# Patient Record
Sex: MALE | Race: WHITE | NOT HISPANIC OR LATINO
[De-identification: names, ages, dates, MRNs, and addresses within clinical notes are randomized per-mention and may not be internally consistent; named-entity substitution may affect disease eponyms.]

---

## 2022-11-16 ENCOUNTER — APPOINTMENT (OUTPATIENT)
Dept: OPHTHALMOLOGY | Facility: CLINIC | Age: 67
End: 2022-11-16

## 2023-11-17 ENCOUNTER — APPOINTMENT (OUTPATIENT)
Dept: DERMATOLOGY | Facility: CLINIC | Age: 68
End: 2023-11-17

## 2024-09-05 ENCOUNTER — OUTPATIENT (OUTPATIENT)
Dept: OUTPATIENT SERVICES | Facility: HOSPITAL | Age: 69
LOS: 1 days | End: 2024-09-05
Payer: COMMERCIAL

## 2024-09-05 VITALS
TEMPERATURE: 97 F | RESPIRATION RATE: 20 BRPM | HEIGHT: 69 IN | WEIGHT: 220.46 LBS | DIASTOLIC BLOOD PRESSURE: 80 MMHG | SYSTOLIC BLOOD PRESSURE: 120 MMHG | HEART RATE: 72 BPM | OXYGEN SATURATION: 99 %

## 2024-09-05 DIAGNOSIS — Z01.818 ENCOUNTER FOR OTHER PREPROCEDURAL EXAMINATION: ICD-10-CM

## 2024-09-05 DIAGNOSIS — Z98.89 OTHER SPECIFIED POSTPROCEDURAL STATES: Chronic | ICD-10-CM

## 2024-09-05 LAB
ALBUMIN SERPL ELPH-MCNC: 4 G/DL — SIGNIFICANT CHANGE UP (ref 3.3–5.2)
ALP SERPL-CCNC: 41 U/L — SIGNIFICANT CHANGE UP (ref 40–120)
ALT FLD-CCNC: 17 U/L — SIGNIFICANT CHANGE UP
ANION GAP SERPL CALC-SCNC: 13 MMOL/L — SIGNIFICANT CHANGE UP (ref 5–17)
APTT BLD: 37.1 SEC — HIGH (ref 24.5–35.6)
AST SERPL-CCNC: 22 U/L — SIGNIFICANT CHANGE UP
BASOPHILS # BLD AUTO: 0.07 K/UL — SIGNIFICANT CHANGE UP (ref 0–0.2)
BASOPHILS NFR BLD AUTO: 0.9 % — SIGNIFICANT CHANGE UP (ref 0–2)
BILIRUB SERPL-MCNC: 0.7 MG/DL — SIGNIFICANT CHANGE UP (ref 0.4–2)
BLD GP AB SCN SERPL QL: SIGNIFICANT CHANGE UP
BUN SERPL-MCNC: 27.9 MG/DL — HIGH (ref 8–20)
CALCIUM SERPL-MCNC: 10.6 MG/DL — HIGH (ref 8.4–10.5)
CHLORIDE SERPL-SCNC: 101 MMOL/L — SIGNIFICANT CHANGE UP (ref 96–108)
CO2 SERPL-SCNC: 24 MMOL/L — SIGNIFICANT CHANGE UP (ref 22–29)
CREAT SERPL-MCNC: 1.58 MG/DL — HIGH (ref 0.5–1.3)
EGFR: 47 ML/MIN/1.73M2 — LOW
EOSINOPHIL # BLD AUTO: 0.41 K/UL — SIGNIFICANT CHANGE UP (ref 0–0.5)
EOSINOPHIL NFR BLD AUTO: 5.2 % — SIGNIFICANT CHANGE UP (ref 0–6)
GLUCOSE SERPL-MCNC: 110 MG/DL — HIGH (ref 70–99)
HCT VFR BLD CALC: 45.5 % — SIGNIFICANT CHANGE UP (ref 39–50)
HGB BLD-MCNC: 15.7 G/DL — SIGNIFICANT CHANGE UP (ref 13–17)
IMM GRANULOCYTES NFR BLD AUTO: 0.9 % — SIGNIFICANT CHANGE UP (ref 0–0.9)
INR BLD: 1.01 RATIO — SIGNIFICANT CHANGE UP (ref 0.85–1.18)
LYMPHOCYTES # BLD AUTO: 1.46 K/UL — SIGNIFICANT CHANGE UP (ref 1–3.3)
LYMPHOCYTES # BLD AUTO: 18.7 % — SIGNIFICANT CHANGE UP (ref 13–44)
MAGNESIUM SERPL-MCNC: 1.8 MG/DL — SIGNIFICANT CHANGE UP (ref 1.8–2.6)
MCHC RBC-ENTMCNC: 30.5 PG — SIGNIFICANT CHANGE UP (ref 27–34)
MCHC RBC-ENTMCNC: 34.5 GM/DL — SIGNIFICANT CHANGE UP (ref 32–36)
MCV RBC AUTO: 88.5 FL — SIGNIFICANT CHANGE UP (ref 80–100)
MONOCYTES # BLD AUTO: 0.65 K/UL — SIGNIFICANT CHANGE UP (ref 0–0.9)
MONOCYTES NFR BLD AUTO: 8.3 % — SIGNIFICANT CHANGE UP (ref 2–14)
NEUTROPHILS # BLD AUTO: 5.16 K/UL — SIGNIFICANT CHANGE UP (ref 1.8–7.4)
NEUTROPHILS NFR BLD AUTO: 66 % — SIGNIFICANT CHANGE UP (ref 43–77)
PLATELET # BLD AUTO: 166 K/UL — SIGNIFICANT CHANGE UP (ref 150–400)
POTASSIUM SERPL-MCNC: 5.1 MMOL/L — SIGNIFICANT CHANGE UP (ref 3.5–5.3)
POTASSIUM SERPL-SCNC: 5.1 MMOL/L — SIGNIFICANT CHANGE UP (ref 3.5–5.3)
PROT SERPL-MCNC: 6.9 G/DL — SIGNIFICANT CHANGE UP (ref 6.6–8.7)
PROTHROM AB SERPL-ACNC: 11.2 SEC — SIGNIFICANT CHANGE UP (ref 9.5–13)
RBC # BLD: 5.14 M/UL — SIGNIFICANT CHANGE UP (ref 4.2–5.8)
RBC # FLD: 13.6 % — SIGNIFICANT CHANGE UP (ref 10.3–14.5)
SODIUM SERPL-SCNC: 138 MMOL/L — SIGNIFICANT CHANGE UP (ref 135–145)
WBC # BLD: 7.82 K/UL — SIGNIFICANT CHANGE UP (ref 3.8–10.5)
WBC # FLD AUTO: 7.82 K/UL — SIGNIFICANT CHANGE UP (ref 3.8–10.5)

## 2024-09-05 PROCEDURE — 86900 BLOOD TYPING SEROLOGIC ABO: CPT

## 2024-09-05 PROCEDURE — 85025 COMPLETE CBC W/AUTO DIFF WBC: CPT

## 2024-09-05 PROCEDURE — 80053 COMPREHEN METABOLIC PANEL: CPT

## 2024-09-05 PROCEDURE — 85610 PROTHROMBIN TIME: CPT

## 2024-09-05 PROCEDURE — 93005 ELECTROCARDIOGRAM TRACING: CPT

## 2024-09-05 PROCEDURE — 86850 RBC ANTIBODY SCREEN: CPT

## 2024-09-05 PROCEDURE — G0463: CPT

## 2024-09-05 PROCEDURE — 93010 ELECTROCARDIOGRAM REPORT: CPT

## 2024-09-05 PROCEDURE — 83735 ASSAY OF MAGNESIUM: CPT

## 2024-09-05 PROCEDURE — 86901 BLOOD TYPING SEROLOGIC RH(D): CPT

## 2024-09-05 PROCEDURE — 36415 COLL VENOUS BLD VENIPUNCTURE: CPT

## 2024-09-05 PROCEDURE — 85730 THROMBOPLASTIN TIME PARTIAL: CPT

## 2024-09-05 RX ORDER — PREDNISONE 10 MG
1 TABLET, DOSE PACK ORAL
Qty: 3 | Refills: 0
Start: 2024-09-05

## 2024-09-05 NOTE — H&P PST ADULT - NSICDXPASTSURGICALHX_GEN_ALL_CORE_FT
PAST SURGICAL HISTORY:  History of hernia surgery umbilical and abdominal hernia repair    S/P cardiac catheterization jan 2015    S/P debridement s/p boil resection on left groin with infection and debridement 2014

## 2024-09-05 NOTE — H&P PST ADULT - ASSESSMENT
Risk Assessments:  ASA:  Mallampati:  Creat:   GFR:   BRA:  Pt assessed, appropriate for sedation and educated regarding plan for Versed/Fentanyl as needed    Indication:     Coronary Anatomy:     Plan:    -plan for *** via ***  -preferred access:   -confirmed appropriate NPO duration  -ECG and Labs reviewed  -Aspirin 81mg po pre-cath  -Normal Saline 0.9%  250ml/hr IV: pre procedure ANA ppx   -procedure discussed with patient; risks and benefits explained, questions answered  -consent obtained by attending      Risk Assessments:  ASA: 3  Mallampati: 3  Creat:   GFR:   BRA:  Pt assessed, appropriate for sedation and educated regarding plan for Versed/Fentanyl as needed    Indication: reduced EF on stress, known CAD s/p  CABG x 3v and PCI       Plan:    -plan for [facility]   -preferred access: RFA vs LRA   -confirmed appropriate NPO duration  -ECG and Labs reviewed  -Aspirin 81mg po pre-cath  -hold xarelto x 3 days, hold metformin evening prior, hold farxiga morning of procedure and take 1/2 dose of Tresiba(20 units) evening prior   -Normal Saline 0.9%  250ml/hr IV: pre procedure ANA ppx   -procedure discussed with patient; risks and benefits explained, questions answered  -consent obtained by attending

## 2024-09-05 NOTE — H&P PST ADULT - NSICDXPASTMEDICALHX_GEN_ALL_CORE_FT
PAST MEDICAL HISTORY:  Aortic aneurysm aortic root dilitation ~ 3.9 cm as seen on echo done 12/2014    Bell's palsy 2009 mild with resolution    Carotid artery disease carotid doppler done 11/26/2014mild heterogenous plauque bilateral with no significant stenosis (0-39%)    Coronary artery disease 3vCAD    Deep vein thrombosis (DVT) 8/2009 chronic left LLE DVT pt reports last LLE doppler done 2014 (on lifetime xarelto) f/u with Dr. 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

## 2024-09-05 NOTE — H&P PST ADULT - HISTORY OF PRESENT ILLNESS
HPI:     Symptoms:        Angina (Class):        Ischemic Symptoms:     Heart Failure:        Systolic/Diastolic/Combined:        NYHA Class (within 2 weeks):     Assessment of LVEF (Must be within 6 months):       EF:        Assessed by:        Date:     Echo (Date, Findings):     Prior Cardiac Interventions:       PCI's (Date, Stents, Vessels):        CABG (Date, Grafts):     Noninvasive Testing:   Stress Test: Date:        Protocol:        Duration of Exercise:        Symptoms:        EKG Changes:        DTS:        Myocardial Imaging:        Risk Assessment (Low, Medium, High):       Antianginal Therapies:        Beta Blockers:         Calcium Channel Blockers:        Long Acting Nitrates:        Ranexa:       Associated Risk Factors:        Fraility Assessment: N/A (mild, moderate, severe)       Cerebrovascular Disease: N/A       Chronic Lung Disease: N/A       Peripheral Arterial Disease: N/A       Chronic Kidney Disease (if yes, what is GFR): N/A       Uncontrolled Diabetes (if yes, what is HgbA1C or FBS): N/A       Poorly Controlled Hypertension (if yes, what is SBP): N/A       Morbid Obesity (if yes, what is BMI): N/A       History of Recent Ventricular Arrhythmia: N/A       Inability to Ambulate Safely: N/A       Need for Therapeutic Anticoagulation: N/A       Antiplatelet or Contrast Allergy: N/A    Social History:        Marital:         Tobacco:        ETOH:        Caffeine:     ROS:  CONSTITUTIONAL: No weakness, fevers or chills  EYES/ENT: No visual changes;  No vertigo or throat pain   NECK: No pain or stiffness  RESPIRATORY: No cough, wheezing, hemoptysis; No shortness of breath  CARDIOVASCULAR: No chest pain or palpitations  GASTROINTESTINAL: No abdominal or epigastric pain. No nausea, vomiting, or hematemesis; No diarrhea or constipation. No melena or hematochezia.  GENITOURINARY: No dysuria, frequency or hematuria  NEUROLOGICAL: No numbness or weakness  SKIN: No itching, burning, rashes, or lesions   All other review of systems is negative unless indicated above    PHYSICAL EXAM:    Vital Signs Last 24 Hrs  T(C): --  T(F): --  HR: --  BP: --  BP(mean): --  RR: --  SpO2: --        GENERAL: Pt lying comfortably, NAD.  ENMT: PERRL, +EOMI.  NECK: soft, Supple, No JVD,   CHEST/LUNG: Clear to auscultatation bilaterally; No wheezing.  HEART: S1S2+, Regular rate and rhythm; No murmurs.  ABDOMEN: Soft, Nontender, Nondistended; Bowel sounds present.  MUSCULOSKELETAL: Normal range of motion.  SKIN: No rashes or lesions.  NEURO: AAOX3, no focal deficits, no motor r sensory loss.  PSYCH: normal mood.  VASCULAR:   Radial +2 R/+2 L  Femoral +2 R/+2 L  PT +2 R/+2 L  DP +2 R/+2 L    EKG:        HPI: This is a 69 year old male with PMH of DVT, Factor V Leiden, Htn, Gout, HLD, and CAD s/p CABG x 3  vessel 2/2015, subsequent PCI 8/2015 and 9/2015 see details below. He was seen for routine follow up at cardiologist office. Although TTE showed EF of 52% stress test had a normal perfusion scan, the EF was reduced on Stress calculation of 37%. It has been 9 years since last [facility], decision for angiogram to rule out CAD progression. Now presents to PST for upcoming procedure with Dr. Hinson on 9/10/2024.Pt denies chest pain, palps, dizziness, orthopnea, or PND. Has occ VELASCO. Of note patient had CKD and doesnt know his baseline creat, labs from 4/17/2024 in PST packet reveal creat of 1.83, GFR 40. Will repeat labs and discuss findings with Dr. Hinson. (needs pre-    Symptoms:        Angina (Class): II        Ischemic Symptoms: VELASCO     Heart Failure: ?        Systolic/Diastolic/Combined: EF on TTE 52% vs 37% on MPS        NYHA Class (within 2 weeks):       Echo (Date, Findings): EF low normal 52%, mild VHD and no WMA     Prior Cardiac Interventions:       PCI's (Date, Stents, Vessels): 8/2015 PCI distal to anastomosis of RPDA, 9/2015 3 BRENT to prox, mid, and distal CIRC       CABG (Date, Grafts): CABG x 3 vessel 2/2015 LIMA to LAD, SVG to OM and SVG to RPDA   < from: Cardiac Cath Lab - Adult (08.26.15 @ 08:31) >  --  Intervention on right PDA: drug-eluting stent.    < end of copied text >  < from: Cardiac Cath Lab - Adult (08.26.15 @ 08:31) >  MEDICATIONS GIVEN: Midazolam, 1 mg, IV. Fentanyl, 50 mcg, IV.  Nitroglycerin, 200 mcg, intracoronary. Nitroglycerin, 200 mcg,  intracoronary. Bivalirudin (Angiomax), 18 ml, IV. Bivalirudin (Angiomax),  infusion rate of 42, IV. Clopidogrel (Plavix), 600 mg,PO.  VENTRICLES: There were no left ventricular global or regional wall motion  abnormalities. Global left ventricular function was normal. EF estimated  was 50 %.  VALVES: MITRAL VALVE: The mitral valve exhibited trivial regurgitation  (less than 1+).  CORONARY VESSELS: The coronary circulation is right dominant.  LM:   --  LM: Normal.  LAD:   --  Mid LAD: There was a diffuse 90 % stenosis.  CX:   --  Proximal circumflex: There was a diffuse 85 % stenosis.  --  Mid circumflex: There was a diffuse 80 % stenosis.  --  OM1: There was a diffuse 70 % stenosis in the proximal third of the  vessel segment.  RCA:   --  Mid RCA: There was a 100 % stenosis.  --  RPDA: There was a diffuse 90 % stenosis distal to the graft  anastomosis. The lesion was hazy. This was treated with BRENT   GRAFTS:   --  Graft to the mid LAD: The graft was a LIMA. It was normal.  --  Graft to the 1st obtuse marginal: The graft was a saphenous vein graft  from the aorta. There was a 100 % stenosis at the proximal anastomosis.  --  Graft to the distal RCA: The graft was a saphenous vein graft from the  aorta. Graft angiography showed mild atherosclerosis.  COMPLICATIONS: No complications occurred during the cath lab visit. No  complications occurred during the cath lab visit.    < from: Cardiac Cath Lab - Adult (09.24.15 @ 17:16) >  LM:   --  LM: Normal.  LAD:   --  LAD: This vessel was not injected.  CX:   --  Proximal circumflex: There was a diffuse 90 % stenosis.  --  Mid circumflex: There was a tubular 80 % stenosis.  --  Distal circumflex: There was a diffuse 80 % stenosis.  RCA:   --  RPDA: There was a tubular 0 % stenosis distal to the graft  anastomosis.  COMPLICATIONS: No complications occurred during the cath lab visit.  SUMMARY:  CORONARY VESSELS: RPDA: There was a tubular 0 % stenosis distal to the  graft anastomosis.  1ST LESION INTERVENTIONS: A successful drug-eluting stent was performed on  the 80 % lesion in the distal circumflex. Following intervention there was  an excellent angiographic appearance with a 0 % residual stenosis.  2ND LESION INTERVENTIONS: A successful drug-eluting stent was performed on  the 90 % lesion in the proximal circumflex. Following intervention there  was an excellent angiographic appearance with a0 % residual stenosis.  3RD LESION INTERVENTIONS: A successful drug-eluting stent was performed on  the 80 % lesion in the mid circumflex. Following intervention there was an  excellent angiographic appearance with a 0 % residual stenosis.  DIAGNOSTIC RECOMMENDATIONS: Can restart Xarelto tomorrow  Can discontinue Aspirin in one month.      Noninvasive Testing:   Stress Test: Date:   7/1/2024 sestamibi  Negative for stress induced ischemia, newly reduced EF 37%      Antianginal Therapies:        Beta Blockers:         Calcium Channel Blockers:        Long Acting Nitrates:        Ranexa:       Associated Risk Factors:        Fraility Assessment: N/A (mild, moderate, severe)       Cerebrovascular Disease: N/A       Chronic Lung Disease: N/A       Peripheral Arterial Disease: N/A       Chronic Kidney Disease (if yes, what is GFR): N/A       Uncontrolled Diabetes (if yes, what is HgbA1C or FBS): N/A       Poorly Controlled Hypertension (if yes, what is SBP): N/A       Morbid Obesity (if yes, what is BMI): N/A       History of Recent Ventricular Arrhythmia: N/A       Inability to Ambulate Safely: N/A       Need for Therapeutic Anticoagulation: N/A       Antiplatelet or Contrast Allergy: N/A    Social History:        Marital:   retired from [employer], still works part time as [employer], denies tobacco or drug use occasional alcoholic beverage     ROS:  CONSTITUTIONAL: No weakness, fevers or chills  EYES/ENT: No visual changes;  No vertigo or throat pain   NECK: No pain or stiffness  RESPIRATORY: No cough, wheezing, hemoptysis; No shortness of breath  CARDIOVASCULAR: No chest pain or palpitations  GASTROINTESTINAL: No abdominal or epigastric pain. No nausea, vomiting, or hematemesis; No diarrhea or constipation. No melena or hematochezia.  GENITOURINARY: No dysuria, frequency or hematuria  NEUROLOGICAL: No numbness or weakness  SKIN: No itching, burning, rashes, or lesions   All other review of systems is negative unless indicated above    PHYSICAL EXAM:      GENERAL: Pt lying comfortably, NAD.  ENMT: PERRL, +EOMI.  NECK: soft, Supple, No JVD,   CHEST/LUNG: Clear to auscultatation bilaterally; No wheezing.  HEART: S1S2+, Regular rate and rhythm; No murmurs.  ABDOMEN: Soft, Nontender, Nondistended; Bowel sounds present.  MUSCULOSKELETAL: Normal range of motion.  SKIN: No rashes or lesions.  NEURO: AAOX3, no focal deficits, no motor r sensory loss.  PSYCH: normal mood.  VASCULAR:   Radial +2 R/+2 L  Femoral +2 R/+2 L  PT +2 R/+2 L  DP +2 R/+2 L    EKG: NSR 71 bpm        HPI: This is a 69 year old male with PMH of DVT, Factor V Leiden, Htn, Gout, HLD, and CAD s/p CABG x 3  vessel 2/2015, subsequent PCI 8/2015 and 9/2015 see details below. He was seen for routine follow up at cardiologist office. Although TTE showed EF of 52% stress test had a normal perfusion scan, the EF was reduced on Stress calculation of 37%. It has been 9 years since last [facility], decision for angiogram to rule out CAD progression. Now presents to PST for upcoming procedure with Dr. Hinson on 9/10/2024.Pt denies chest pain, palps, dizziness, orthopnea, or PND. Has occ VELASCO. Of note patient had CKD and doesnt know his baseline creat, labs from 4/17/2024 in PST packet reveal creat of 1.83, GFR 40. Will repeat labs and discuss findings with Dr. Hinson. (pre med prednisone sent to pharm for IVC allergy)    Symptoms:        Angina (Class): II        Ischemic Symptoms: VELASCO     Heart Failure: ?        Systolic/Diastolic/Combined: EF on TTE 52% vs 37% on MPS        NYHA Class (within 2 weeks):       Echo (Date, Findings): EF low normal 52%, mild VHD and no WMA     Prior Cardiac Interventions:       PCI's (Date, Stents, Vessels): 8/2015 PCI distal to anastomosis of RPDA, 9/2015 3 BRENT to prox, mid, and distal CIRC       CABG (Date, Grafts): CABG x 3 vessel 2/2015 LIMA to LAD, SVG to OM and SVG to RPDA   < from: Cardiac Cath Lab - Adult (08.26.15 @ 08:31) >  --  Intervention on right PDA: drug-eluting stent.    < end of copied text >  < from: Cardiac Cath Lab - Adult (08.26.15 @ 08:31) >  MEDICATIONS GIVEN: Midazolam, 1 mg, IV. Fentanyl, 50 mcg, IV.  Nitroglycerin, 200 mcg, intracoronary. Nitroglycerin, 200 mcg,  intracoronary. Bivalirudin (Angiomax), 18 ml, IV. Bivalirudin (Angiomax),  infusion rate of 42, IV. Clopidogrel (Plavix), 600 mg,PO.  VENTRICLES: There were no left ventricular global or regional wall motion  abnormalities. Global left ventricular function was normal. EF estimated  was 50 %.  VALVES: MITRAL VALVE: The mitral valve exhibited trivial regurgitation  (less than 1+).  CORONARY VESSELS: The coronary circulation is right dominant.  LM:   --  LM: Normal.  LAD:   --  Mid LAD: There was a diffuse 90 % stenosis.  CX:   --  Proximal circumflex: There was a diffuse 85 % stenosis.  --  Mid circumflex: There was a diffuse 80 % stenosis.  --  OM1: There was a diffuse 70 % stenosis in the proximal third of the  vessel segment.  RCA:   --  Mid RCA: There was a 100 % stenosis.  --  RPDA: There was a diffuse 90 % stenosis distal to the graft  anastomosis. The lesion was hazy. This was treated with BRENT   GRAFTS:   --  Graft to the mid LAD: The graft was a LIMA. It was normal.  --  Graft to the 1st obtuse marginal: The graft was a saphenous vein graft  from the aorta. There was a 100 % stenosis at the proximal anastomosis.  --  Graft to the distal RCA: The graft was a saphenous vein graft from the  aorta. Graft angiography showed mild atherosclerosis.  COMPLICATIONS: No complications occurred during the cath lab visit. No  complications occurred during the cath lab visit.    < from: Cardiac Cath Lab - Adult (09.24.15 @ 17:16) >  LM:   --  LM: Normal.  LAD:   --  LAD: This vessel was not injected.  CX:   --  Proximal circumflex: There was a diffuse 90 % stenosis.  --  Mid circumflex: There was a tubular 80 % stenosis.  --  Distal circumflex: There was a diffuse 80 % stenosis.  RCA:   --  RPDA: There was a tubular 0 % stenosis distal to the graft  anastomosis.  COMPLICATIONS: No complications occurred during the cath lab visit.  SUMMARY:  CORONARY VESSELS: RPDA: There was a tubular 0 % stenosis distal to the  graft anastomosis.  1ST LESION INTERVENTIONS: A successful drug-eluting stent was performed on  the 80 % lesion in the distal circumflex. Following intervention there was  an excellent angiographic appearance with a 0 % residual stenosis.  2ND LESION INTERVENTIONS: A successful drug-eluting stent was performed on  the 90 % lesion in the proximal circumflex. Following intervention there  was an excellent angiographic appearance with a0 % residual stenosis.  3RD LESION INTERVENTIONS: A successful drug-eluting stent was performed on  the 80 % lesion in the mid circumflex. Following intervention there was an  excellent angiographic appearance with a 0 % residual stenosis.  DIAGNOSTIC RECOMMENDATIONS: Can restart Xarelto tomorrow  Can discontinue Aspirin in one month.      Noninvasive Testing:   Stress Test: Date:   7/1/2024 sestamibi  Negative for stress induced ischemia, newly reduced EF 37%      Antianginal Therapies:        Beta Blockers:         Calcium Channel Blockers:        Long Acting Nitrates:        Ranexa:       Associated Risk Factors:        Fraility Assessment: N/A (mild, moderate, severe)       Cerebrovascular Disease: N/A       Chronic Lung Disease: N/A       Peripheral Arterial Disease: N/A       Chronic Kidney Disease (if yes, what is GFR): N/A       Uncontrolled Diabetes (if yes, what is HgbA1C or FBS): N/A       Poorly Controlled Hypertension (if yes, what is SBP): N/A       Morbid Obesity (if yes, what is BMI): N/A       History of Recent Ventricular Arrhythmia: N/A       Inability to Ambulate Safely: N/A       Need for Therapeutic Anticoagulation: N/A       Antiplatelet or Contrast Allergy: N/A    Social History:        Marital:   retired from [employer], still works part time as [employer], denies tobacco or drug use occasional alcoholic beverage     ROS:  CONSTITUTIONAL: No weakness, fevers or chills  EYES/ENT: No visual changes;  No vertigo or throat pain   NECK: No pain or stiffness  RESPIRATORY: No cough, wheezing, hemoptysis; No shortness of breath  CARDIOVASCULAR: No chest pain or palpitations  GASTROINTESTINAL: No abdominal or epigastric pain. No nausea, vomiting, or hematemesis; No diarrhea or constipation. No melena or hematochezia.  GENITOURINARY: No dysuria, frequency or hematuria  NEUROLOGICAL: No numbness or weakness  SKIN: No itching, burning, rashes, or lesions   All other review of systems is negative unless indicated above    PHYSICAL EXAM:      GENERAL: Pt lying comfortably, NAD.  ENMT: PERRL, +EOMI.  NECK: soft, Supple, No JVD,   CHEST/LUNG: Clear to auscultatation bilaterally; No wheezing.  HEART: S1S2+, Regular rate and rhythm; No murmurs.  ABDOMEN: Soft, Nontender, Nondistended; Bowel sounds present.  MUSCULOSKELETAL: Normal range of motion.  SKIN: No rashes or lesions.  NEURO: AAOX3, no focal deficits, no motor r sensory loss.  PSYCH: normal mood.  VASCULAR:   Radial +2 R/+2 L  Femoral +2 R/+2 L  PT +2 R/+2 L  DP +2 R/+2 L    EKG: NSR 71 bpm   Home Medications:  allopurinol 300 mg oral tablet: 1 tab(s) orally once a day (05 Sep 2024 17:19)  amLODIPine 5 mg oral tablet: 1 tab(s) orally once a day (05 Sep 2024 17:19)  aspirin 81 mg oral delayed release tablet: 1 tab(s) orally once a day (05 Sep 2024 17:19)  atenolol 25 mg oral tablet: 1 tab(s) orally 2 times a day (05 Sep 2024 17:19)  Bydureon Pen 2 mg subcutaneous injection, extended release: 1 application subcutaneous once a week (05 Sep 2024 17:17)  Crestor 20 mg oral tablet: 1 tab(s) orally once a day (at bedtime) (05 Sep 2024 17:19)  Farxiga 10 mg oral tablet: 1 tab(s) orally once a day (05 Sep 2024 17:19)  folic acid 1 mg oral tablet: 1 tab(s) orally once a day (05 Sep 2024 17:19)  losartan 50 mg oral tablet: 1 tab(s) orally once a day (05 Sep 2024 17:19)  metFORMIN 1000 mg oral tablet: 1 tab(s) orally 2 times a day (with meals) (05 Sep 2024 17:19)  multivitamin: 1 cap(s) orally once a day (05 Sep 2024 17:19)  Tresiba 100 units/mL subcutaneous solution: 40 unit(s) subcutaneous once a day (at bedtime) (05 Sep 2024 17:19)  Xarelto 10 mg oral tablet: 1 tab(s) orally once a day (05 Sep 2024 17:19)

## 2024-09-10 ENCOUNTER — OUTPATIENT (OUTPATIENT)
Dept: OUTPATIENT SERVICES | Facility: HOSPITAL | Age: 69
LOS: 1 days | End: 2024-09-10
Payer: COMMERCIAL

## 2024-09-10 ENCOUNTER — TRANSCRIPTION ENCOUNTER (OUTPATIENT)
Age: 69
End: 2024-09-10

## 2024-09-10 VITALS
DIASTOLIC BLOOD PRESSURE: 92 MMHG | SYSTOLIC BLOOD PRESSURE: 181 MMHG | OXYGEN SATURATION: 100 % | TEMPERATURE: 98 F | HEART RATE: 86 BPM | RESPIRATION RATE: 15 BRPM

## 2024-09-10 VITALS
OXYGEN SATURATION: 95 % | SYSTOLIC BLOOD PRESSURE: 127 MMHG | HEART RATE: 83 BPM | RESPIRATION RATE: 15 BRPM | DIASTOLIC BLOOD PRESSURE: 64 MMHG

## 2024-09-10 DIAGNOSIS — Z98.89 OTHER SPECIFIED POSTPROCEDURAL STATES: Chronic | ICD-10-CM

## 2024-09-10 DIAGNOSIS — R94.39 ABNORMAL RESULT OF OTHER CARDIOVASCULAR FUNCTION STUDY: ICD-10-CM

## 2024-09-10 LAB — GLUCOSE BLDC GLUCOMTR-MCNC: 265 MG/DL — HIGH (ref 70–99)

## 2024-09-10 PROCEDURE — C1887: CPT

## 2024-09-10 PROCEDURE — 82962 GLUCOSE BLOOD TEST: CPT

## 2024-09-10 PROCEDURE — C1769: CPT

## 2024-09-10 PROCEDURE — 93459 L HRT ART/GRFT ANGIO: CPT

## 2024-09-10 PROCEDURE — C1760: CPT

## 2024-09-10 PROCEDURE — C1894: CPT

## 2024-09-10 RX ORDER — SODIUM CHLORIDE 9 MG/ML
100 INJECTION INTRAMUSCULAR; INTRAVENOUS; SUBCUTANEOUS ONCE
Refills: 0 | Status: COMPLETED | OUTPATIENT
Start: 2024-09-10 | End: 2024-09-10

## 2024-09-10 RX ORDER — ROSUVASTATIN CALCIUM 10 MG/1
1 TABLET ORAL
Refills: 0 | DISCHARGE

## 2024-09-10 RX ORDER — EXENATIDE 250 UG/ML
1 INJECTION SUBCUTANEOUS
Refills: 0 | DISCHARGE

## 2024-09-10 RX ORDER — CHLORHEXIDINE GLUCONATE 40 MG/ML
1 SOLUTION TOPICAL ONCE
Refills: 0 | Status: DISCONTINUED | OUTPATIENT
Start: 2024-09-10 | End: 2024-09-24

## 2024-09-10 RX ORDER — DAPAGLIFLOZIN 10 MG/1
1 TABLET, FILM COATED ORAL
Refills: 0 | DISCHARGE

## 2024-09-10 RX ORDER — FAMOTIDINE 10 MG/ML
20 INJECTION INTRAVENOUS ONCE
Refills: 0 | Status: COMPLETED | OUTPATIENT
Start: 2024-09-10 | End: 2024-09-10

## 2024-09-10 RX ORDER — RIVAROXABAN 10 MG/1
1 TABLET, FILM COATED ORAL
Qty: 0 | Refills: 0 | DISCHARGE

## 2024-09-10 RX ORDER — FLU VACCINE TS 2012-2013(5YR+) 45MCG/.5ML
0.5 VIAL (ML) INTRAMUSCULAR ONCE
Refills: 0 | Status: DISCONTINUED | OUTPATIENT
Start: 2024-09-10 | End: 2024-09-24

## 2024-09-10 RX ORDER — INSULIN DEGLUDEC INJECTION 100 U/ML
40 INJECTION, SOLUTION SUBCUTANEOUS
Refills: 0 | DISCHARGE

## 2024-09-10 RX ADMIN — FAMOTIDINE 20 MILLIGRAM(S): 10 INJECTION INTRAVENOUS at 13:39

## 2024-09-10 RX ADMIN — SODIUM CHLORIDE 100 MILLILITER(S): 9 INJECTION INTRAMUSCULAR; INTRAVENOUS; SUBCUTANEOUS at 14:33

## 2024-09-10 RX ADMIN — SODIUM CHLORIDE 100 MILLILITER(S): 9 INJECTION INTRAMUSCULAR; INTRAVENOUS; SUBCUTANEOUS at 12:51

## 2024-09-10 NOTE — DISCHARGE NOTE PROVIDER - NSDCMRMEDTOKEN_GEN_ALL_CORE_FT
allopurinol 300 mg oral tablet: 1 tab(s) orally once a day  amLODIPine 5 mg oral tablet: 1 tab(s) orally once a day  aspirin 81 mg oral delayed release tablet: 1 tab(s) orally once a day  atenolol 25 mg oral tablet: 1 tab(s) orally 2 times a day  Bydureon Pen 2 mg subcutaneous injection, extended release: 1 application subcutaneous once a week  Crestor 20 mg oral tablet: 1 tab(s) orally once a day (at bedtime)  Farxiga 10 mg oral tablet: 1 tab(s) orally once a day  folic acid 1 mg oral tablet: 1 tab(s) orally once a day  losartan 50 mg oral tablet: 1 tab(s) orally once a day  metFORMIN 1000 mg oral tablet: 1 tab(s) orally 2 times a day (with meals) Please continue 9/12/24 EVENING DOSE  multivitamin: 1 cap(s) orally once a day  Tresiba 100 units/mL subcutaneous solution: 40 unit(s) subcutaneous once a day (at bedtime)  Xarelto 10 mg oral tablet: 1 tab(s) orally once a day Please continue 9/11/24 regularly scheduled time

## 2024-09-10 NOTE — DISCHARGE NOTE PROVIDER - CARE PROVIDER_API CALL
Eleon Hinson  Interventional Cardiology  [address] [place] [address], [address]  [place] [address]  Phone: [phone]  Fax: [phone]  Established Patient  Follow Up Time: 2 weeks

## 2024-09-10 NOTE — DISCHARGE NOTE NURSING/CASE MANAGEMENT/SOCIAL WORK - PATIENT PORTAL LINK FT
You can access the FollowMyHealth Patient Portal offered by [facility] by registering at the following website: [email or web address]. By joining [facility]’s FollowMyHealth portal, you will also be able to view your health information using other applications (apps) compatible with our system.

## 2024-09-10 NOTE — DISCHARGE NOTE PROVIDER - NSDCCPCAREPLAN_GEN_ALL_CORE_FT
PRINCIPAL DISCHARGE DIAGNOSIS  Diagnosis: Coronary artery disease  Assessment and Plan of Treatment:

## 2024-09-10 NOTE — DISCHARGE NOTE PROVIDER - NSDCFUADDINST_GEN_ALL_CORE_FT
-Continue current medical therapy as prescribed  -Cont anti platelet therapy with aspirin  -Cont statin therapy with Crestor 20mg po qHS **   -Please restart Metformin on 9/12/24 EVENING DOSE  -Please restart Xarelto 10mg PO QD on 9/11/24    - Bruising at the groin, sometimes extending down the leg, and/or a small lump under the skin at the groin access site is normal and will resolve within 2 – 3 weeks.   - Occasional skipped beats or palpitations that last for a few beats are common and generally resolve within 1-2 months.   - You may walk and take stairs at a regular pace.   - Do not perform any exercise more strenuous than walking for 1 week.   - Do not strain or lift heavy objects for 1 week.  - You may shower the day after the procedure.  - Do not soak in water (such as tub baths, hot tubs, swimming, etc.) for 1 week.   - You may resume all other activities the day after the procedure.  Call your doctor if:   - you notice bleeding, redness, drainage, swelling, increased tenderness or a hot sensation around the catheter insertion site.   - your temperature is greater than 100 degrees F for more than 24 hours.  - your rapid heart rhythm returns.  - you have any questions or concerns regarding the procedure.  If significant bleeding and/or a large lump (the size of a golf ball or bigger) occurs:  - Lie flat and apply continuous direct pressure just above the puncture site for at least 10 minutes  - If the issue resolves, notify your physician immediately.    - If the bleeding cannot be controlled, please seek immediate medical attention.  If you experience increased difficulty breathing or chest pain, or if you faint or have dizzy spells, please seek immediate medical attention.

## 2024-09-10 NOTE — DISCHARGE NOTE PROVIDER - NSDCCPTREATMENT_GEN_ALL_CORE_FT
PRINCIPAL PROCEDURE  Procedure: Left heart cardiac cath  Findings and Treatment: open LIMA to LAD graft, closed SVG to OM1 graft, open SVG to dRCA graft, open stents to RPDA/pLCx/mLCx/dLCx, LVEDP 19-21mmHg, EF 60%

## 2024-09-10 NOTE — CHART NOTE - NSCHARTNOTEFT_GEN_A_CORE
Assessment: Presents today for LHC to be performed by Dr. Hinson  PST done on 9/5/24, no interval change, reviewed labs and ECG   Indication: reduced EF on NST, h/o CAD, r/o progression of CAD    ROS: as stated above, otherwise negative    PHYSICAL EXAM:  Constitutional: A & O x 3, NAD  HEENT:  Normal oral mucosa, PERRL, EOMI	  Cardiovascular: S1 S2, no murmur, No JVD  Respiratory: Lungs clear to auscultation	  Gastrointestinal:  Soft, Non-tender, + BS	  Skin: No rashes or cyanosis  Neurologic: No deficit appreciated  Extremities: Normal range of motion, no LE edema  Vascular: distal pulses +     Risk Assessments:  ASA: 3  Mallampati: 3  Creat: 1.58  GFR: 47  BRA: 1.0%  Pt assessed, appropriate for sedation, pt educated regarding the plan for Versed/Fentanyl as needed.    Plan/Recommendations:   -plan for LHC  -preferred access: RRA vs. RFA  -patient seen and examined  -confirmed appropriate NPO duration  -ECG and Labs reviewed  -Aspirin 81mg po pre-cath  -NS 100mL IV bolus pre-cath  -procedure discussed with patient; risks and benefits explained, questions answered  -consent obtained by attending IC    Risks, benefits, and alternatives reviewed.  Risks including but not limited to MI, death, stroke, bleeding, infection, vessel injury, hematoma, renal failure, allergic reaction, urgent open heart surgery, restenosis and stent thrombosis were reviewed.  All questions answered.  Patient is agreeable to proceed. Assessment: Presents today for LHC to be performed by Dr. Hinson  PST done on 9/5/24, no interval change, reviewed labs and ECG   Indication: reduced EF on NST, h/o CAD, r/o progression of CAD    ROS: as stated above, otherwise negative    PHYSICAL EXAM:  Constitutional: A & O x 3, NAD  HEENT:  Normal oral mucosa, PERRL, EOMI	  Cardiovascular: S1 S2, no murmur, No JVD  Respiratory: Lungs clear to auscultation	  Gastrointestinal:  Soft, Non-tender, + BS	  Skin: No rashes or cyanosis  Neurologic: No deficit appreciated  Extremities: Normal range of motion, no LE edema  Vascular: distal pulses +     Risk Assessments:  ASA: 3  Mallampati: 3  Creat: 1.58  GFR: 47  BRA: 1.0%  Pt assessed, appropriate for sedation, pt educated regarding the plan for Versed/Fentanyl as needed.    Plan/Recommendations:   -plan for LHC  -preferred access: LRA vs. RFA  -patient seen and examined  -confirmed appropriate NPO duration  -ECG and Labs reviewed  -Aspirin 81mg po pre-cath  -NS 100mL IV bolus pre-cath  -procedure discussed with patient; risks and benefits explained, questions answered  -consent obtained by attending IC    Risks, benefits, and alternatives reviewed.  Risks including but not limited to MI, death, stroke, bleeding, infection, vessel injury, hematoma, renal failure, allergic reaction, urgent open heart surgery, restenosis and stent thrombosis were reviewed.  All questions answered.  Patient is agreeable to proceed. Assessment: Presents today for LHC to be performed by Dr. Hinson  PST done on 9/5/24, no interval change, reviewed labs and ECG   Indication: reduced EF on NST, h/o CAD, r/o progression of CAD    ROS: as stated above, otherwise negative    PHYSICAL EXAM:  Constitutional: A & O x 3, NAD  HEENT:  Normal oral mucosa, PERRL, EOMI	  Cardiovascular: S1 S2, no murmur, No JVD  Respiratory: Lungs clear to auscultation	  Gastrointestinal:  Soft, Non-tender, + BS	  Skin: No rashes or cyanosis  Neurologic: No deficit appreciated  Extremities: Normal range of motion, no LE edema  Vascular: distal pulses +     Risk Assessments:  ASA: 3  Mallampati: 3  Creat: 1.58  GFR: 47  BRA: 1.0%  Pt assessed, appropriate for sedation, pt educated regarding the plan for Versed/Fentanyl as needed.    Plan/Recommendations:   -Patient endorses that he took Prednisone twice yesterday and once this AM 1 hour prior to procedure  -Pepcid 20mg PO x 1 given for IV contrast allergy  -plan for LHC  -preferred access: LRA vs. RFA  -patient seen and examined  -confirmed appropriate NPO duration  -ECG and Labs reviewed  -Aspirin 81mg po pre-cath  -NS 100mL IV bolus pre-cath  -procedure discussed with patient; risks and benefits explained, questions answered  -consent obtained by attending IC    Risks, benefits, and alternatives reviewed.  Risks including but not limited to MI, death, stroke, bleeding, infection, vessel injury, hematoma, renal failure, allergic reaction, urgent open heart surgery, restenosis and stent thrombosis were reviewed.  All questions answered.  Patient is agreeable to proceed. none

## 2024-09-10 NOTE — DISCHARGE NOTE NURSING/CASE MANAGEMENT/SOCIAL WORK - NSDCVIVACCINE_GEN_ALL_CORE_FT
influenza, injectable, quadrivalent, preservative free; 25-Sep-2015 15:44; Sarah Jimenez (RN); [employer]; [id number]; IntraMuscular; Deltoid Left.; 0.5 milliLiter(s); VIS (VIS Published: 07-Aug-2015, VIS Presented: 25-Sep-2015);

## 2024-09-10 NOTE — DISCHARGE NOTE PROVIDER - HOSPITAL COURSE
69 year old male with PMH of DVT, Factor V Leiden, Htn, Gout, HLD, and CAD s/p CABG x 3  vessel 2/2015, subsequent PCI 8/2015 and 9/2015 see details below. He was seen for routine follow up at cardiologist office. Although TTE showed EF of 52% stress test had a normal perfusion scan, the EF was reduced on Stress calculation of 37%. It has been 9 years since last [facility], decision for angiogram to rule out CAD progression. Now presents to [facility] for upcoming procedure with Dr. Hinson on 9/10/2024.Pt denies chest pain, palps, dizziness, orthopnea, or PND. Has occ VELASCO. Of note patient had CKD and doesn't know his baseline creat, labs from 4/17/2024 in PST packet reveal creat of 1.83, GFR 40. Repeat labs sent from [facility] with SCr 1.58. Pre-medication prednisone sent to pharm for IVC allergy from PST. Presented for [facility] this AM, took prednisone 50mg 1 hour prior to arrival and pepcid 20mg PO x 1.    Now s/p LHC via RFA with Dr. Hinson, tolerated procedure well. Pt arrived to recovery in NAD and HDS, RFA access site stable, no bleed/hematoma, distal pulse +,   Intraprocedural findings: open LIMA to LAD graft, closed SVG to OM1 graft, open SVG to dRCA graft, open stents to RPDA/pLCx/mLCx/dLCx, LVEDP 19-21mmHg, EF 60%  Closure Device: RFA Angioseal    Plan:  -Formal cath report pending  -Post procedure management/monitoring per protocol  -Access site precautions  -Continue current medical therapy  -Cont anti platelet therapy with aspirin  -Cont statin therapy with Crestor 20mg po qHS **   -Please restart Metformin on 9/12/24 EVENING DOSE  -Please restart Xarelto 10mg PO QD on 9/11/24  -Educated regarding post procedure management and care  -Discussed the importance of RF modification  -F/U outpt in 1-2 weeks with Cardiologist Dr. Hinson

## 2024-09-10 NOTE — CHART NOTE - NSCHARTNOTEFT_GEN_A_CORE
Now s/p LHC via RFA with Dr. Hinson, tolerated procedure well. Pt arrived to recovery in NAD and HDS, RFA access site stable, no bleed/hematoma, distal pulse +,   Intraprocedural findings: open LIMA to LAD graft, closed SVG to OM1 graft, open SVG to dRCA graft, open stents to RPDA/pLCx/mLCx/dLCx, LVEDP 19-21mmHg  Medications:  P2Y12 inhibitor: N/A  Fentanyl: 50mcg IVP  Versed: 1mg IVP  Heparin: N/A  Visipaque: 75cc  Closure Device: RFA Angioseal  Post Cath EKG: N/A    Plan:  -Formal cath report pending  -Post procedure management/monitoring per protocol  -Access site precautions  -Bedrest x 1 hours post procedure  -NS 0.9% 100ml/hr x 1 bolus: post procedure ANA ppx   -Continue current medical therapy  -Cont anti platelet therapy with aspirin  -Cont statin therapy with Crestor 20mg po qHS **   -Educated regarding post procedure management and care  -Discussed the importance of RF modification  -F/U outpt in 1-2 weeks with Cardiologist Dr. Hinson  -DISPO: Plan for D/C 2 hours post procedure if remains HDS and without complications once approved by Dr. Hinson. Now s/p LHC via RFA with Dr. Hinson, tolerated procedure well. Pt arrived to recovery in NAD and HDS, RFA access site stable, no bleed/hematoma, distal pulse +,   Intraprocedural findings: open LIMA to LAD graft, closed SVG to OM1 graft, open SVG to dRCA graft, open stents to RPDA/pLCx/mLCx/dLCx, LVEDP 19-21mmHg, EF 60%  Medications:  P2Y12 inhibitor: N/A  Fentanyl: 50mcg IVP  Versed: 1mg IVP  Heparin: N/A  Visipaque: 75cc  Closure Device: RFA Angioseal  Post Cath EKG: N/A    Plan:  -Formal cath report pending  -Post procedure management/monitoring per protocol  -Access site precautions  -Bedrest x 1 hours post procedure  -NS 0.9% 100ml/hr x 1 bolus: post procedure ANA ppx   -Continue current medical therapy  -Cont anti platelet therapy with aspirin  -Cont statin therapy with Crestor 20mg po qHS **   -Educated regarding post procedure management and care  -Discussed the importance of RF modification  -F/U outpt in 1-2 weeks with Cardiologist Dr. Hinson  -DISPO: Plan for D/C 2 hours post procedure if remains HDS and without complications once approved by Dr. Hinson. Now s/p LHC via RFA with Dr. Hinson, tolerated procedure well. Pt arrived to recovery in NAD and HDS, RFA access site stable, no bleed/hematoma, distal pulse +,   Intraprocedural findings: open LIMA to LAD graft, closed SVG to OM1 graft, open SVG to dRCA graft, open stents to RPDA/pLCx/mLCx/dLCx, LVEDP 19-21mmHg, EF 60%  Medications:  P2Y12 inhibitor: N/A  Fentanyl: 50mcg IVP  Versed: 1mg IVP  Heparin: N/A  Visipaque: 75cc  Closure Device: RFA Angioseal  Post Cath EKG: N/A    Plan:  -Formal cath report pending  -Post procedure management/monitoring per protocol  -Access site precautions  -Bedrest x 1 hours post procedure  -NS 0.9% 100ml/hr x 1 bolus: post procedure ANA ppx   -Continue current medical therapy  -Cont anti platelet therapy with aspirin  -Cont statin therapy with Crestor 20mg po qHS **   -Please restart Metformin on 9/12/24 EVENING DOSE  -Please restart Xarelto 10mg PO QD on 9/11/24  -Educated regarding post procedure management and care  -Discussed the importance of RF modification  -F/U outpt in 1-2 weeks with Cardiologist Dr. Hinson  -DISPO: Plan for D/C 2 hours post procedure if remains HDS and without complications once approved by Dr. Hinson.

## 2024-09-10 NOTE — CONSULT NOTE ADULT - SUBJECTIVE AND OBJECTIVE BOX
Patient is a 69y old  Male who presents with a chief complaint of recent new onset SOB on exertion.    HPI:  This is a 69 year old male with PMH of DVT, Factor V Leiden, Htn, Gout, HLD, and CAD s/p CABG x 3  vessel 2/2015, subsequent PCI 8/2015 and 9/2015 who presents today with complaints of recent new onset SOB on exertion.        PAST MEDICAL & SURGICAL HISTORY:  Carotid artery disease  carotid doppler done 11/26/2014mild heterogenous plauque bilateral with no significant stenosis (0-39%)  Deep vein thrombosis (DVT)  8/2009 chronic left LLE DVT pt reports last LLE doppler done 2014 (on lifetime xarelto) f/u with Dr. 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  HTN (hypertension)  HLD (hyperlipidemia)  DM (diabetes mellitus)  Bell's palsy  2009 mild with resolution  Gout  Nephrolithiasis  h/o lithotripsy x 3 (12/2012, 2013, 1/2014)  Aortic aneurysm  aortic root dilitation ~ 3.9 cm as seen on echo done 12/2014  Coronary artery disease 3vCAD  History of hernia surgery  umbilical and abdominal hernia repair  S/P debridement  s/p boil resection on left groin with infection and debridement 2014  S/P cardiac catheterization  jan 2015          PREVIOUS DIAGNOSTIC TESTING:      ECHO  FINDINGS: LVEF low normal 52%      Noninvasive Testing:   Stress Test: Date: 7/1/2024  Negative for stress induced ischemia, newly reduced EF 37%      CATHETERIZATION  FINDINGS: 8/2015 PCI distal to anastomosis of RPDA, 9/2015 3 BRENT to prox, mid, and distal CIRC       CABG (Date, Grafts): CABG x 3 vessel 2/2015 LIMA to LAD, SVG to OM and SVG to RPDA           Allergies  IV Contrast (Short breath)    MEDICATIONS  (HOME):  Allopurinol 300 mg oral tablet: 1 tab(s) orally once a day (05 Sep 2024 17:19)  amLODIPine 5 mg oral tablet: 1 tab(s) orally once a day (05 Sep 2024 17:19)  aspirin 81 mg oral delayed release tablet: 1 tab(s) orally once a day (05 Sep 2024 17:19)  atenolol 25 mg oral tablet: 1 tab(s) orally 2 times a day (05 Sep 2024 17:19)  Bydureon Pen 2 mg subcutaneous injection, extended release: 1 application subcutaneous once a week (05 Sep 2024 17:17)  Crestor 20 mg oral tablet: 1 tab(s) orally once a day (at bedtime) (05 Sep 2024 17:19)  Farxiga 10 mg oral tablet: 1 tab(s) orally once a day (05 Sep 2024 17:19)  folic acid 1 mg oral tablet: 1 tab(s) orally once a day (05 Sep 2024 17:19)  losartan 50 mg oral tablet: 1 tab(s) orally once a day (05 Sep 2024 17:19)  metFORMIN 1000 mg oral tablet: 1 tab(s) orally 2 times a day (with meals) (05 Sep 2024 17:19)  multivitamin: 1 cap(s) orally once a day (05 Sep 2024 17:19)  Tresiba 100 units/mL subcutaneous solution: 40 unit(s) subcutaneous once a day (at bedtime) (05 Sep 2024 17:19)  Xarelto 10 mg oral tablet: 1 tab(s) orally once a day (05 Sep 2024 17:19)chlorhexidine 4% Liquid 1 Application(s) Topical Once        FAMILY HISTORY:  Family history of heart failure        CIGARETTES: NEVER    ALCOHOL: OCCASIONAL      REVIEW OF SYSTEMS:  CONSTITUTIONAL: No fever, weight loss, or fatigue  EYES: No eye pain, visual disturbances, or discharge  ENMT:  No difficulty hearing, tinnitus, vertigo; No sinus or throat pain  NECK: No pain or stiffness  RESPIRATORY: No cough, wheezing, chills or hemoptysis; + Shortness of Breath  CARDIOVASCULAR: No chest pain, palpitations, passing out, dizziness, or leg swelling  GASTROINTESTINAL: No abdominal or epigastric pain. No nausea, vomiting, or hematemesis; No diarrhea or constipation. No melena or hematochezia.  GENITOURINARY: No dysuria, frequency, hematuria, or incontinence  NEUROLOGICAL: No headaches, memory loss, loss of strength, numbness, or tremors  SKIN: No itching, burning, rashes, or lesions   ENDOCRINE: No heat or cold intolerance; No hair loss  MUSCULOSKELETAL: No joint pain or swelling; No muscle, back, or extremity pain  PSYCHIATRIC: No depression, anxiety, mood swings, or difficulty sleeping  HEME/LYMPH: No easy bruising, or bleeding gums  ALLERY AND IMMUNOLOGIC: No hives or eczema	    Vital Signs Last 24 Hrs  T(C): 36.8 (10 Sep 2024 12:22), Max: 36.8 (10 Sep 2024 12:22)  T(F): 98.2 (10 Sep 2024 12:22), Max: 98.2 (10 Sep 2024 12:22)  HR: 83 (10 Sep 2024 14:40) (82 - 86)  BP: 127/64 (10 Sep 2024 14:40) (127/64 - 181/92)  BP(mean): --  RR: 15 (10 Sep 2024 14:40) (14 - 18)  SpO2: 95% (10 Sep 2024 14:40) (84% - 100%)    Parameters below as of 10 Sep 2024 14:55  Patient On (Oxygen Delivery Method): room air        PHYSICAL EXAM:  Appearance: Normal appearance	  HEENT:   Normal oral mucosa, PERRL, EOMI, sclera non-icteric	  Cardiovascular: Normal S1 S2, No JVD, No cardiac murmurs, No carotid bruits, No peripheral edema  Respiratory: Lungs clear to auscultation	  Psychiatry: A & O x 3, Mood & affect appropriate  Gastrointestinal:  Soft, Non-tender, + BS, no bruits	  Skin: No rashes, No ecchymoses, No cyanosis  Neurologic: Grossly non-focal with full strength in all four extremities  Extremities: Normal range of motion, No clubbing, cyanosis or edema  Vascular: Peripheral pulses bilaterally      INTERPRETATION OF TELEMETRY: NSR      Assessment:  This is a 69 year old male with PMH of DVT, Factor V Leiden, Htn, Gout, HLD, and CAD s/p CABG x 3  vessel 2/2015, subsequent PCI 8/2015 and 9/2015 who presents today with complaints of recent new onset SOB on exertion.    Due to Pt's current symptoms, significant cardiac history and recently abnormal NST I am recommending cardiac cath for further cardiac evaluation.    Plan:  Cardiac catheterization and possible percutaneous intervention recommended.  Risks, benefits, and alternatives reviewed.  Risks including but not limited to MI, death, stroke, bleeding, infection, vessel injury, hematoma, renal failure, allergic reaction, urgent open heart surgery, restenosis and stent thrombosis were reviewed.  All questions answered.  Patient is agreeable to proceed.